# Patient Record
Sex: MALE | Race: AMERICAN INDIAN OR ALASKA NATIVE
[De-identification: names, ages, dates, MRNs, and addresses within clinical notes are randomized per-mention and may not be internally consistent; named-entity substitution may affect disease eponyms.]

---

## 2019-07-06 ENCOUNTER — HOSPITAL ENCOUNTER (EMERGENCY)
Dept: HOSPITAL 5 - ED | Age: 29
Discharge: HOME | End: 2019-07-06
Payer: COMMERCIAL

## 2019-07-06 VITALS — SYSTOLIC BLOOD PRESSURE: 134 MMHG | DIASTOLIC BLOOD PRESSURE: 65 MMHG

## 2019-07-06 DIAGNOSIS — F12.90: ICD-10-CM
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
## 2019-07-06 DIAGNOSIS — F17.200: ICD-10-CM

## 2019-07-06 DIAGNOSIS — R55: Primary | ICD-10-CM
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
## 2019-07-06 DIAGNOSIS — R07.89: ICD-10-CM

## 2019-07-06 DIAGNOSIS — M54.2: ICD-10-CM

## 2019-07-06 LAB
ALBUMIN SERPL-MCNC: 4.3 G/DL (ref 3.9–5)
ALT SERPL-CCNC: 15 UNITS/L (ref 7–56)
BASOPHILS # (AUTO): 0 K/MM3 (ref 0–0.1)
BASOPHILS NFR BLD AUTO: 0.4 % (ref 0–1.8)
BUN SERPL-MCNC: 8 MG/DL (ref 9–20)
BUN/CREAT SERPL: 8 %
CALCIUM SERPL-MCNC: 9.6 MG/DL (ref 8.4–10.2)
EOSINOPHIL # BLD AUTO: 0.1 K/MM3 (ref 0–0.4)
EOSINOPHIL NFR BLD AUTO: 1.2 % (ref 0–4.3)
HCT VFR BLD CALC: 41.8 % (ref 35.5–45.6)
HEMOLYSIS INDEX: 23
HGB BLD-MCNC: 14.5 GM/DL (ref 11.8–15.2)
LYMPHOCYTES # BLD AUTO: 1.5 K/MM3 (ref 1.2–5.4)
LYMPHOCYTES NFR BLD AUTO: 23.7 % (ref 13.4–35)
MCHC RBC AUTO-ENTMCNC: 35 % (ref 32–34)
MCV RBC AUTO: 87 FL (ref 84–94)
MONOCYTES # (AUTO): 0.2 K/MM3 (ref 0–0.8)
MONOCYTES % (AUTO): 3.6 % (ref 0–7.3)
PLATELET # BLD: 290 K/MM3 (ref 140–440)
RBC # BLD AUTO: 4.8 M/MM3 (ref 3.65–5.03)

## 2019-07-06 PROCEDURE — 84443 ASSAY THYROID STIM HORMONE: CPT

## 2019-07-06 PROCEDURE — 71046 X-RAY EXAM CHEST 2 VIEWS: CPT

## 2019-07-06 PROCEDURE — 93005 ELECTROCARDIOGRAM TRACING: CPT

## 2019-07-06 PROCEDURE — 93010 ELECTROCARDIOGRAM REPORT: CPT

## 2019-07-06 PROCEDURE — 70450 CT HEAD/BRAIN W/O DYE: CPT

## 2019-07-06 PROCEDURE — 99284 EMERGENCY DEPT VISIT MOD MDM: CPT

## 2019-07-06 PROCEDURE — 84484 ASSAY OF TROPONIN QUANT: CPT

## 2019-07-06 PROCEDURE — 85025 COMPLETE CBC W/AUTO DIFF WBC: CPT

## 2019-07-06 PROCEDURE — 80053 COMPREHEN METABOLIC PANEL: CPT

## 2019-07-06 PROCEDURE — 36415 COLL VENOUS BLD VENIPUNCTURE: CPT

## 2019-07-06 PROCEDURE — 72040 X-RAY EXAM NECK SPINE 2-3 VW: CPT
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
## 2019-07-06 NOTE — XRAY REPORT
CHEST 2 VIEWS



INDICATION / CLINICAL INFORMATION:

Dizziness and syncope.



COMPARISON: 

None available.



FINDINGS:



SUPPORT DEVICES: None.

HEART / MEDIASTINUM: The heart size and pulmonary vasculature are normal. The aorta is normal in nancy
isac. 

LUNGS / PLEURA: No significant pulmonary or pleural abnormality. No pneumothorax. 

ADDITIONAL FINDINGS: No significant additional findings.



IMPRESSION: No acute findings.



Signer Name: Yoshi Sultana MD 

Signed: 7/6/2019 2:39 PM

 Workstation Name: [id number]
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
